# Patient Record
Sex: MALE | Race: WHITE | NOT HISPANIC OR LATINO | Employment: FULL TIME | URBAN - METROPOLITAN AREA
[De-identification: names, ages, dates, MRNs, and addresses within clinical notes are randomized per-mention and may not be internally consistent; named-entity substitution may affect disease eponyms.]

---

## 2022-10-04 ENCOUNTER — HOSPITAL ENCOUNTER (EMERGENCY)
Facility: CLINIC | Age: 37
Discharge: HOME | End: 2022-10-04
Attending: EMERGENCY MEDICINE | Admitting: EMERGENCY MEDICINE

## 2022-10-04 VITALS
SYSTOLIC BLOOD PRESSURE: 132 MMHG | HEART RATE: 83 BPM | WEIGHT: 225 LBS | TEMPERATURE: 98 F | OXYGEN SATURATION: 97 % | RESPIRATION RATE: 18 BRPM | DIASTOLIC BLOOD PRESSURE: 98 MMHG

## 2022-10-04 DIAGNOSIS — K08.89 PAIN, DENTAL: ICD-10-CM

## 2022-10-04 DIAGNOSIS — K04.7 DENTAL ABSCESS: ICD-10-CM

## 2022-10-04 PROBLEM — F10.21 CHRONIC ALCOHOLISM IN REMISSION (H): Status: ACTIVE | Noted: 2022-10-04

## 2022-10-04 PROBLEM — F43.10 POSTTRAUMATIC STRESS DISORDER: Status: ACTIVE | Noted: 2022-10-04

## 2022-10-04 PROBLEM — K74.60 CIRRHOSIS OF LIVER (H): Status: ACTIVE | Noted: 2022-10-04

## 2022-10-04 PROBLEM — I85.00 ESOPHAGEAL VARICES (H): Status: ACTIVE | Noted: 2022-10-04

## 2022-10-04 PROBLEM — F10.10 ALCOHOL ABUSE: Status: ACTIVE | Noted: 2022-10-04

## 2022-10-04 PROBLEM — F90.9 ATTENTION DEFICIT HYPERACTIVITY DISORDER: Status: ACTIVE | Noted: 2022-10-04

## 2022-10-04 PROBLEM — R74.8 ELEVATED LIVER ENZYMES: Status: ACTIVE | Noted: 2022-10-04

## 2022-10-04 PROCEDURE — 99284 EMERGENCY DEPT VISIT MOD MDM: CPT | Mod: 25

## 2022-10-04 PROCEDURE — 64400 NJX AA&/STRD TRIGEMINAL NRV: CPT

## 2022-10-04 PROCEDURE — 250N000013 HC RX MED GY IP 250 OP 250 PS 637: Performed by: EMERGENCY MEDICINE

## 2022-10-04 RX ORDER — PENICILLIN V POTASSIUM 250 MG/1
500 TABLET, FILM COATED ORAL ONCE
Status: COMPLETED | OUTPATIENT
Start: 2022-10-04 | End: 2022-10-04

## 2022-10-04 RX ORDER — PENICILLIN V POTASSIUM 500 MG/1
500 TABLET, FILM COATED ORAL 4 TIMES DAILY
Qty: 40 TABLET | Refills: 0 | Status: SHIPPED | OUTPATIENT
Start: 2022-10-04 | End: 2022-10-14

## 2022-10-04 RX ORDER — HYDROCODONE BITARTRATE AND ACETAMINOPHEN 5; 325 MG/1; MG/1
1-2 TABLET ORAL EVERY 4 HOURS PRN
Qty: 18 TABLET | Refills: 0 | Status: SHIPPED | OUTPATIENT
Start: 2022-10-04 | End: 2022-10-07

## 2022-10-04 RX ORDER — HYDROCODONE BITARTRATE AND ACETAMINOPHEN 5; 325 MG/1; MG/1
2 TABLET ORAL ONCE
Status: COMPLETED | OUTPATIENT
Start: 2022-10-04 | End: 2022-10-04

## 2022-10-04 RX ADMIN — HYDROCODONE BITARTRATE AND ACETAMINOPHEN 2 TABLET: 5; 325 TABLET ORAL at 02:34

## 2022-10-04 RX ADMIN — PENICILLIN V POTASSIUM 500 MG: 250 TABLET, FILM COATED ORAL at 02:53

## 2022-10-04 NOTE — ED TRIAGE NOTES
Left sided canine pain for the past 3 days   Unable to be seen by a dentist     Triage Assessment     Row Name 10/04/22 0223       Triage Assessment (Adult)    Airway WDL WDL       Respiratory WDL    Respiratory WDL WDL       Skin Circulation/Temperature WDL    Skin Circulation/Temperature WDL WDL       Cardiac WDL    Cardiac WDL WDL       Peripheral/Neurovascular WDL    Peripheral Neurovascular WDL WDL       Cognitive/Neuro/Behavioral WDL    Cognitive/Neuro/Behavioral WDL WDL

## 2022-10-04 NOTE — ED PROVIDER NOTES
EMERGENCY DEPARTMENT ENCOUnter      NAME: Gabriel Traore  AGE: 37 year old male  YOB: 1985  MRN: [id number]  EVALUATION DATE & TIME: No admission date for patient encounter.    PCP: System, Provider Not In    ED PROVIDER: Janett Moy MD      Chief Complaint   Patient presents with     Dental Pain         FINAL IMPRESSION:  1. Dental abscess    2. Pain, dental          ED COURSE & MEDICAL DECISION MAKING:      In summary, the patient is a 37-year-old male that presents to the emergency department for evaluation of tooth pain thought secondary to a dental abscess.  2:23 AM I met with the patient, obtained history, performed an initial exam, and discussed options and plan for diagnostics and treatment here in the ED.  2:30 AM Preformed Dental block procedure   2:35 AM reevaluation reveals that his pain is improved.  We discussed the plan for discharge and the patient is agreeable. Reviewed supportive cares, symptomatic treatment, outpatient follow up, and reasons to return to the Emergency Department. Patient to be discharged by ED RN.       At the conclusion of the encounter I discussed the results of all of the tests and the disposition. The questions were answered. The patient or family acknowledged understanding and was agreeable with the care plan.         MEDICATIONS GIVEN IN THE EMERGENCY:  Medications   penicillin V (VEETID) tablet 500 mg (500 mg Oral Given 10/4/22 0253)   HYDROcodone-acetaminophen (NORCO) 5-325 MG per tablet 2 tablet (2 tablets Oral Given 10/4/22 0234)       NEW PRESCRIPTIONS STARTED AT TODAY'S ER VISIT  Discharge Medication List as of 10/4/2022  2:54 AM      START taking these medications    Details   HYDROcodone-acetaminophen (NORCO) 5-325 MG tablet Take 1-2 tablets by mouth every 4 hours as needed for moderate to severe pain, Disp-18 tablet, R-0, Local Print      penicillin V (VEETID) 500 MG tablet Take 1 tablet (500 mg) by mouth 4 times daily for 10 days, Disp-40  "tablet, R-0, Local Print                =================================================================    HPI        Gabriel Traore is a 37 year old male with a pertinent history of chronic alcoholism, cirrhosis of liver, PTSD, ADHAD who presents to this ED by walking for evaluation of tooth pain.     On 10/02/2022, patient states he broke his upper left canine tooth. Patient endorsed intermittent throbbing but on 10/03, pain became consistent. At present, patient endorses excruciating dental pain, rated as a 12/10. He has an upcoming dental appointment but states, \"the pain is so bad that if I don't get anything done here, I'll go home and pull it out myself.\" Patient states he just needs to get through until the morning. Patient denies fever, vomiting, or any other complaints at this time.     Of note, patient denies current social history of alcohol consumption or smoking. Patient also denies any allergies. He currently takes Adderall daily.     REVIEW OF SYSTEMS     Constitutional:  Denies fever or chills  HENT:  Positive for tooth pain (upper left canine). Denies sore throat   Respiratory:  Denies cough or shortness of breath   Cardiovascular:  Denies chest pain or palpitations  GI:  Denies abdominal pain, nausea, or vomiting  Musculoskeletal:  Denies any new extremity pain   Skin:  Denies rash   Neurologic:  Denies headache, focal weakness or sensory changes    All other systems reviewed and are negative      PAST MEDICAL HISTORY:  History reviewed. No pertinent past medical history.    PAST SURGICAL HISTORY:  History reviewed. No pertinent surgical history.        CURRENT MEDICATIONS:    No current outpatient medications on file.      ALLERGIES:  No Known Allergies    FAMILY HISTORY:  History reviewed. No pertinent family history.    SOCIAL HISTORY:   Social History     Socioeconomic History     Marital status:      Spouse name: None     Number of children: None     Years of education: None     " Highest education level: None       VITALS:  No data found.    PHYSICAL EXAM    Constitutional:  Well developed, Well nourished,  HENT:  Normocephalic, Atraumatic, Bilateral external ears normal, Oropharynx moist, Nose normal.   Neck:  Normal range of motion, No meningismus, No stridor.   Eyes:  EOMI, Conjunctiva normal, No discharge.   Respiratory:  Normal breath sounds, No respiratory distress, No wheezing, No chest tenderness.   Cardiovascular:  Normal heart rate, Normal rhythm, No murmurs  GI:  Soft, No tenderness, No guarding, No CVA tenderness.   Musculoskeletal:  Neurovascularly intact distally, No edema, No tenderness, No cyanosis, Good range of motion in all major joints.   Integument:  Warm, Dry, No erythema, No rash.   Lymphatic:  No lymphadenopathy noted.   Neurologic:  Alert & oriented  Normal motor function,  No focal deficits noted.   Psychiatric:  Affect normal, Judgment normal, Mood normal.          PROCEDURE:   PROCEDURE: Dental Nerve Block   INDICATIONS: Dental pain   PROCEDURE PROVIDER: Dr. Janett Moy    SITE: Tooth #11   MEDICATION: Bupivacaine HCL 0.5% with 1:200,000 epinephrine   NOTE:  Area was aspirated and there was no return of blood.  I then injected the medication into the site.  The patient had good response to the procedure   COMPLICATIONS: None         I, Megha Figueroa, am serving as a scribe to document services personally performed by Dr. Moy based on my observation and the provider's statements to me. I, Janett Moy MD attest that Megha Figueroa is acting in a scribe capacity, has observed my performance of the services and has documented them in accordance with my direction.    Janett Moy MD  Emergency Medicine  [facility] EMERGENCY ROOM  [address] [id number] [phone]  Dept: [phone]     Janett Moy MD  12/07/22 0058

## 2022-10-04 NOTE — DISCHARGE INSTRUCTIONS
Ibuprofen 600 mg every 6 hours as needed for pain  Dramamine as needed for nausea or vomiting  Please see your dentist in the next 1 to 2 days for further care of your tooth infection  Return to the emergency department for worsening problems or concerns